# Patient Record
Sex: MALE | Race: WHITE | Employment: FULL TIME | URBAN - NONMETROPOLITAN AREA
[De-identification: names, ages, dates, MRNs, and addresses within clinical notes are randomized per-mention and may not be internally consistent; named-entity substitution may affect disease eponyms.]

---

## 2020-11-20 RX ORDER — LISINOPRIL 40 MG/1
40 TABLET ORAL DAILY
COMMUNITY

## 2020-11-20 RX ORDER — PREDNISONE 20 MG/1
20 TABLET ORAL 2 TIMES DAILY
COMMUNITY

## 2020-11-20 NOTE — PROGRESS NOTES
Following instructions given to patient, who states understanding:     NPO after midnight  [employer] and 's license  Wear comfortable clean clothing  Do not bring jewelry   Shower night before and morning of surgery with a liquid antibacterial soap  Bring medications in original bottles  Follow all instructions given by your physician   needed at discharge  Call [phone] for any questions  Report to [facility] on 2nd floor  If you would become ill prior to surgery, please call the surgeon  May have only 1 visitor accompany you for surgery  Please bring and wear mask  You will be receiving a phone call one or two days before surgery to review covid screening exposure     Covid screening questionnaire complete and negative for symptoms or exposure see chart for documentation.      covid test done 11/18/20 at [facility]    Please limit your exposure to the public after you have your covid test  Please call your doctor immediately if you develop any symptoms of covid prior to your surgery

## 2020-11-20 NOTE — PROGRESS NOTES
In preparation for their surgical procedure above patient was screened for Obstructive Sleep Apnea (GINNY) using the STOP-Bang Questionnaire by the Pre-Admission Testing department. This is a pre-surgical screening tool for patient safety and serves as a recommendation, this WILL NOT cause cancellation of surgery. STOP-Bang Questionnaire  * Do you currently see a pulmonologist?  No     If yes STOP, do not complete. Patient follows with Dr.     1.  Do you snore loudly (able to be heard in the next room)? No    2. Do you often feel tired or sleepy during the daytime? No       3. Has anyone ever told you that you stop breathing during your sleep? No    4. Do you have or are you being treated for high blood pressure? Yes      5. BMI more than 35? BMI (Calculated): 22.8        No    6. Age over 48 years? 54 y.o. No    7. Neck Circumference greater than 17 inches for male or 16 inches for female? Measured           (visits only)            Not Applicable    8. Gender Male? Yes      TOTAL SCORE: 2    GINNY - Low Risk : Yes to 0 - 2 questions  GINNY - Intermediate Risk : Yes to 3 - 4 questions  GINNY - High Risk : Yes to 5 - 8 questions    Adapted from:   STOP Questionnaire: A Tool to Screen Patients for Obstructive Sleep Apnea   SETH Ruffin.P.C., Betty Carvajal M.B.B.S., Geno Hernandez M.D., Ramirez Huggins. Mike Hauser, Ph.D., IFTIKHAR Ortiz.B.B.S., Radha Mendoza M.Sc., Chastity Alcazar M.D., Deja Duncan. SETH Latham.P.C.    Anesthesiology 2008; [id number] Copyright 2008, the [address] of Anesthesiologists, [facility].   ----------------------------------------------------------------------------------------------------------------

## 2020-11-24 NOTE — PROGRESS NOTES
Patient contacted regarding COVID-19 screen. Following questions asked: In the last month, have you been in contact with someone who was confirmed or suspected to have Coronavirus/COVID-19:  Patient stated NO      Pt was informed can be a visitor allowed. Please bring masks. Do you or family members have any of the following symptoms:  Cough-no   Muscle pain-no   Shortness of breath-no   Fever-no   Weakness-no  Severe headache-no   Sore throat-no   Respiratory symptoms-no    Have you traveled internationally in the last month?  No     Have you been to the emergency room recently-no

## 2020-11-25 ENCOUNTER — APPOINTMENT (OUTPATIENT)
Dept: GENERAL RADIOLOGY | Age: 45
End: 2020-11-25
Attending: ORTHOPAEDIC SURGERY
Payer: COMMERCIAL

## 2020-11-25 ENCOUNTER — ANESTHESIA (OUTPATIENT)
Dept: OPERATING ROOM | Age: 45
End: 2020-11-25
Payer: COMMERCIAL

## 2020-11-25 ENCOUNTER — HOSPITAL ENCOUNTER (OUTPATIENT)
Age: 45
Discharge: HOME | End: 2020-11-26
Attending: ORTHOPAEDIC SURGERY | Admitting: ORTHOPAEDIC SURGERY
Payer: COMMERCIAL

## 2020-11-25 ENCOUNTER — ANESTHESIA EVENT (OUTPATIENT)
Dept: OPERATING ROOM | Age: 45
End: 2020-11-25
Payer: COMMERCIAL

## 2020-11-25 VITALS — TEMPERATURE: 98.2 F | DIASTOLIC BLOOD PRESSURE: 76 MMHG | OXYGEN SATURATION: 100 % | SYSTOLIC BLOOD PRESSURE: 140 MMHG

## 2020-11-25 PROBLEM — M47.10 SPONDYLOSIS WITH MYELOPATHY: Status: ACTIVE | Noted: 2020-11-25

## 2020-11-25 LAB
ABO: NORMAL
ANTIBODY SCREEN: NORMAL
POTASSIUM SERPL-SCNC: 4.2 MEQ/L (ref 3.5–5.2)
RH FACTOR: NORMAL

## 2020-11-25 PROCEDURE — 3700000000 HC ANESTHESIA ATTENDED CARE: Performed by: ORTHOPAEDIC SURGERY

## 2020-11-25 PROCEDURE — 72020 X-RAY EXAM OF SPINE 1 VIEW: CPT

## 2020-11-25 PROCEDURE — 2500000003 HC RX 250 WO HCPCS: Performed by: NURSE ANESTHETIST, CERTIFIED REGISTERED

## 2020-11-25 PROCEDURE — 6360000002 HC RX W HCPCS: Performed by: ANESTHESIOLOGY

## 2020-11-25 PROCEDURE — 2500000003 HC RX 250 WO HCPCS: Performed by: ORTHOPAEDIC SURGERY

## 2020-11-25 PROCEDURE — 6370000000 HC RX 637 (ALT 250 FOR IP): Performed by: PHYSICIAN ASSISTANT

## 2020-11-25 PROCEDURE — L0120 CERV FLEX N/ADJ FOAM PRE OTS: HCPCS | Performed by: ORTHOPAEDIC SURGERY

## 2020-11-25 PROCEDURE — 36415 COLL VENOUS BLD VENIPUNCTURE: CPT

## 2020-11-25 PROCEDURE — 86900 BLOOD TYPING SEROLOGIC ABO: CPT

## 2020-11-25 PROCEDURE — 3700000001 HC ADD 15 MINUTES (ANESTHESIA): Performed by: ORTHOPAEDIC SURGERY

## 2020-11-25 PROCEDURE — 6360000002 HC RX W HCPCS: Performed by: NURSE ANESTHETIST, CERTIFIED REGISTERED

## 2020-11-25 PROCEDURE — 3600000014 HC SURGERY LEVEL 4 ADDTL 15MIN: Performed by: ORTHOPAEDIC SURGERY

## 2020-11-25 PROCEDURE — 2580000003 HC RX 258: Performed by: NURSE ANESTHETIST, CERTIFIED REGISTERED

## 2020-11-25 PROCEDURE — 2780000010 HC IMPLANT OTHER: Performed by: ORTHOPAEDIC SURGERY

## 2020-11-25 PROCEDURE — C1713 ANCHOR/SCREW BN/BN,TIS/BN: HCPCS | Performed by: ORTHOPAEDIC SURGERY

## 2020-11-25 PROCEDURE — 7100000010 HC PHASE II RECOVERY - FIRST 15 MIN: Performed by: ORTHOPAEDIC SURGERY

## 2020-11-25 PROCEDURE — 2580000003 HC RX 258: Performed by: PHYSICIAN ASSISTANT

## 2020-11-25 PROCEDURE — 7100000001 HC PACU RECOVERY - ADDTL 15 MIN: Performed by: ORTHOPAEDIC SURGERY

## 2020-11-25 PROCEDURE — 6360000002 HC RX W HCPCS: Performed by: PHYSICIAN ASSISTANT

## 2020-11-25 PROCEDURE — 7100000000 HC PACU RECOVERY - FIRST 15 MIN: Performed by: ORTHOPAEDIC SURGERY

## 2020-11-25 PROCEDURE — 86850 RBC ANTIBODY SCREEN: CPT

## 2020-11-25 PROCEDURE — 84132 ASSAY OF SERUM POTASSIUM: CPT

## 2020-11-25 PROCEDURE — 86901 BLOOD TYPING SEROLOGIC RH(D): CPT

## 2020-11-25 PROCEDURE — 2709999900 HC NON-CHARGEABLE SUPPLY: Performed by: ORTHOPAEDIC SURGERY

## 2020-11-25 PROCEDURE — 3600000004 HC SURGERY LEVEL 4 BASE: Performed by: ORTHOPAEDIC SURGERY

## 2020-11-25 DEVICE — GRAFT BNE SUB W11XH6XL14MM CORT INTBDY FUS FRZ DRY BLK SPCR: Type: IMPLANTABLE DEVICE | Status: FUNCTIONAL

## 2020-11-25 DEVICE — SCREW 3120515 4.0 X 15 SELF DRILL VAR
Type: IMPLANTABLE DEVICE | Site: NECK | Status: FUNCTIONAL
Brand: ATLANTIS® ANTERIOR CERVICAL PLATE SYSTEM

## 2020-11-25 DEVICE — GRAFT BNE SUB W11XH7XL14MM CORT INTBDY FUS FRZ DRY BLK SPCR: Type: IMPLANTABLE DEVICE | Status: FUNCTIONAL

## 2020-11-25 DEVICE — DBM T41120 1CC GRAFTON GEL
Type: IMPLANTABLE DEVICE | Site: NECK | Status: FUNCTIONAL
Brand: GRAFTON®AND GRAFTON PLUS®DEMINERALIZED BONE MATRIX (DBM)

## 2020-11-25 RX ORDER — SODIUM CHLORIDE 0.9 % (FLUSH) 0.9 %
10 SYRINGE (ML) INJECTION PRN
Status: DISCONTINUED | OUTPATIENT
Start: 2020-11-25 | End: 2020-11-25

## 2020-11-25 RX ORDER — LIDOCAINE HCL/PF 100 MG/5ML
SYRINGE (ML) INJECTION PRN
Status: DISCONTINUED | OUTPATIENT
Start: 2020-11-25 | End: 2020-11-25 | Stop reason: SDUPTHER

## 2020-11-25 RX ORDER — HYDROMORPHONE HCL 110MG/55ML
PATIENT CONTROLLED ANALGESIA SYRINGE INTRAVENOUS PRN
Status: DISCONTINUED | OUTPATIENT
Start: 2020-11-25 | End: 2020-11-25 | Stop reason: SDUPTHER

## 2020-11-25 RX ORDER — NEOSTIGMINE METHYLSULFATE 5 MG/5 ML
SYRINGE (ML) INTRAVENOUS PRN
Status: DISCONTINUED | OUTPATIENT
Start: 2020-11-25 | End: 2020-11-25 | Stop reason: SDUPTHER

## 2020-11-25 RX ORDER — SODIUM CHLORIDE 0.9 % (FLUSH) 0.9 %
10 SYRINGE (ML) INJECTION EVERY 12 HOURS SCHEDULED
Status: DISCONTINUED | OUTPATIENT
Start: 2020-11-25 | End: 2020-11-25

## 2020-11-25 RX ORDER — SODIUM CHLORIDE 9 MG/ML
INJECTION, SOLUTION INTRAVENOUS CONTINUOUS PRN
Status: DISCONTINUED | OUTPATIENT
Start: 2020-11-25 | End: 2020-11-25 | Stop reason: SDUPTHER

## 2020-11-25 RX ORDER — ONDANSETRON 2 MG/ML
4 INJECTION INTRAMUSCULAR; INTRAVENOUS
Status: DISCONTINUED | OUTPATIENT
Start: 2020-11-25 | End: 2020-11-25

## 2020-11-25 RX ORDER — ONDANSETRON 2 MG/ML
INJECTION INTRAMUSCULAR; INTRAVENOUS PRN
Status: DISCONTINUED | OUTPATIENT
Start: 2020-11-25 | End: 2020-11-25 | Stop reason: SDUPTHER

## 2020-11-25 RX ORDER — LISINOPRIL 40 MG/1
40 TABLET ORAL DAILY
Status: DISCONTINUED | OUTPATIENT
Start: 2020-11-26 | End: 2020-11-26 | Stop reason: HOSPADM

## 2020-11-25 RX ORDER — OXYCODONE HYDROCHLORIDE AND ACETAMINOPHEN 5; 325 MG/1; MG/1
1 TABLET ORAL EVERY 4 HOURS PRN
Status: DISCONTINUED | OUTPATIENT
Start: 2020-11-25 | End: 2020-11-26 | Stop reason: HOSPADM

## 2020-11-25 RX ORDER — OXYCODONE HYDROCHLORIDE AND ACETAMINOPHEN 5; 325 MG/1; MG/1
2 TABLET ORAL EVERY 4 HOURS PRN
Status: DISCONTINUED | OUTPATIENT
Start: 2020-11-25 | End: 2020-11-26 | Stop reason: HOSPADM

## 2020-11-25 RX ORDER — PROMETHAZINE HYDROCHLORIDE 25 MG/1
12.5 TABLET ORAL EVERY 6 HOURS PRN
Status: DISCONTINUED | OUTPATIENT
Start: 2020-11-25 | End: 2020-11-26 | Stop reason: HOSPADM

## 2020-11-25 RX ORDER — ONDANSETRON 2 MG/ML
4 INJECTION INTRAMUSCULAR; INTRAVENOUS EVERY 6 HOURS PRN
Status: DISCONTINUED | OUTPATIENT
Start: 2020-11-25 | End: 2020-11-26 | Stop reason: HOSPADM

## 2020-11-25 RX ORDER — DEXAMETHASONE SODIUM PHOSPHATE 10 MG/ML
INJECTION, EMULSION INTRAMUSCULAR; INTRAVENOUS PRN
Status: DISCONTINUED | OUTPATIENT
Start: 2020-11-25 | End: 2020-11-25 | Stop reason: SDUPTHER

## 2020-11-25 RX ORDER — MEPERIDINE HYDROCHLORIDE 25 MG/ML
12.5 INJECTION INTRAMUSCULAR; INTRAVENOUS; SUBCUTANEOUS EVERY 5 MIN PRN
Status: DISCONTINUED | OUTPATIENT
Start: 2020-11-25 | End: 2020-11-25

## 2020-11-25 RX ORDER — FENTANYL CITRATE 50 UG/ML
50 INJECTION, SOLUTION INTRAMUSCULAR; INTRAVENOUS EVERY 5 MIN PRN
Status: DISCONTINUED | OUTPATIENT
Start: 2020-11-25 | End: 2020-11-25

## 2020-11-25 RX ORDER — SENNA AND DOCUSATE SODIUM 50; 8.6 MG/1; MG/1
1 TABLET, FILM COATED ORAL 2 TIMES DAILY
Status: DISCONTINUED | OUTPATIENT
Start: 2020-11-25 | End: 2020-11-26 | Stop reason: HOSPADM

## 2020-11-25 RX ORDER — LABETALOL 20 MG/4 ML (5 MG/ML) INTRAVENOUS SYRINGE
5 EVERY 10 MIN PRN
Status: DISCONTINUED | OUTPATIENT
Start: 2020-11-25 | End: 2020-11-25

## 2020-11-25 RX ORDER — GLYCOPYRROLATE 1 MG/5 ML
SYRINGE (ML) INTRAVENOUS PRN
Status: DISCONTINUED | OUTPATIENT
Start: 2020-11-25 | End: 2020-11-25 | Stop reason: SDUPTHER

## 2020-11-25 RX ORDER — LIDOCAINE HYDROCHLORIDE AND EPINEPHRINE 10; 10 MG/ML; UG/ML
INJECTION, SOLUTION INFILTRATION; PERINEURAL PRN
Status: DISCONTINUED | OUTPATIENT
Start: 2020-11-25 | End: 2020-11-25 | Stop reason: ALTCHOICE

## 2020-11-25 RX ORDER — SODIUM CHLORIDE 0.9 % (FLUSH) 0.9 %
10 SYRINGE (ML) INJECTION PRN
Status: DISCONTINUED | OUTPATIENT
Start: 2020-11-25 | End: 2020-11-26 | Stop reason: HOSPADM

## 2020-11-25 RX ORDER — PROPOFOL 10 MG/ML
INJECTION, EMULSION INTRAVENOUS PRN
Status: DISCONTINUED | OUTPATIENT
Start: 2020-11-25 | End: 2020-11-25 | Stop reason: SDUPTHER

## 2020-11-25 RX ORDER — DEXAMETHASONE SODIUM PHOSPHATE 10 MG/ML
8 INJECTION, EMULSION INTRAMUSCULAR; INTRAVENOUS EVERY 8 HOURS
Status: COMPLETED | OUTPATIENT
Start: 2020-11-25 | End: 2020-11-26

## 2020-11-25 RX ORDER — SODIUM CHLORIDE 9 MG/ML
INJECTION, SOLUTION INTRAVENOUS CONTINUOUS
Status: DISCONTINUED | OUTPATIENT
Start: 2020-11-25 | End: 2020-11-26 | Stop reason: HOSPADM

## 2020-11-25 RX ORDER — SODIUM CHLORIDE 0.9 % (FLUSH) 0.9 %
10 SYRINGE (ML) INJECTION EVERY 12 HOURS SCHEDULED
Status: DISCONTINUED | OUTPATIENT
Start: 2020-11-25 | End: 2020-11-26 | Stop reason: HOSPADM

## 2020-11-25 RX ORDER — ROCURONIUM BROMIDE 10 MG/ML
INJECTION, SOLUTION INTRAVENOUS PRN
Status: DISCONTINUED | OUTPATIENT
Start: 2020-11-25 | End: 2020-11-25 | Stop reason: SDUPTHER

## 2020-11-25 RX ORDER — FENTANYL CITRATE 50 UG/ML
INJECTION, SOLUTION INTRAMUSCULAR; INTRAVENOUS PRN
Status: DISCONTINUED | OUTPATIENT
Start: 2020-11-25 | End: 2020-11-25 | Stop reason: SDUPTHER

## 2020-11-25 RX ORDER — CYCLOBENZAPRINE HCL 10 MG
10 TABLET ORAL 3 TIMES DAILY PRN
Status: DISCONTINUED | OUTPATIENT
Start: 2020-11-25 | End: 2020-11-26 | Stop reason: HOSPADM

## 2020-11-25 RX ORDER — POLYETHYLENE GLYCOL 3350 17 G/17G
17 POWDER, FOR SOLUTION ORAL DAILY
Status: DISCONTINUED | OUTPATIENT
Start: 2020-11-25 | End: 2020-11-26 | Stop reason: HOSPADM

## 2020-11-25 RX ORDER — PREDNISONE 20 MG/1
20 TABLET ORAL 2 TIMES DAILY
Status: DISCONTINUED | OUTPATIENT
Start: 2020-11-26 | End: 2020-11-26 | Stop reason: HOSPADM

## 2020-11-25 RX ADMIN — HYDROMORPHONE HYDROCHLORIDE 0.5 MG: 2 INJECTION INTRAMUSCULAR; INTRAVENOUS; SUBCUTANEOUS at 10:26

## 2020-11-25 RX ADMIN — FENTANYL CITRATE 50 MCG: 50 INJECTION, SOLUTION INTRAMUSCULAR; INTRAVENOUS at 09:13

## 2020-11-25 RX ADMIN — PROPOFOL 20 MG: 10 INJECTION, EMULSION INTRAVENOUS at 10:06

## 2020-11-25 RX ADMIN — PROPOFOL 20 MG: 10 INJECTION, EMULSION INTRAVENOUS at 09:25

## 2020-11-25 RX ADMIN — PROPOFOL 10 MG: 10 INJECTION, EMULSION INTRAVENOUS at 10:18

## 2020-11-25 RX ADMIN — HYDROMORPHONE HYDROCHLORIDE 0.5 MG: 1 INJECTION, SOLUTION INTRAMUSCULAR; INTRAVENOUS; SUBCUTANEOUS at 11:52

## 2020-11-25 RX ADMIN — DOCUSATE SODIUM 50MG AND SENNOSIDES 8.6MG 1 TABLET: 8.6; 5 TABLET, FILM COATED ORAL at 20:27

## 2020-11-25 RX ADMIN — BISACODYL 5 MG: 5 TABLET, COATED ORAL at 20:27

## 2020-11-25 RX ADMIN — FENTANYL CITRATE 100 MCG: 50 INJECTION, SOLUTION INTRAMUSCULAR; INTRAVENOUS at 08:51

## 2020-11-25 RX ADMIN — PROPOFOL 20 MG: 10 INJECTION, EMULSION INTRAVENOUS at 09:51

## 2020-11-25 RX ADMIN — HYDROMORPHONE HYDROCHLORIDE 0.5 MG: 2 INJECTION INTRAMUSCULAR; INTRAVENOUS; SUBCUTANEOUS at 09:51

## 2020-11-25 RX ADMIN — PROPOFOL 20 MG: 10 INJECTION, EMULSION INTRAVENOUS at 09:34

## 2020-11-25 RX ADMIN — DEXAMETHASONE SODIUM PHOSPHATE 8 MG: 10 INJECTION, EMULSION INTRAMUSCULAR; INTRAVENOUS at 09:14

## 2020-11-25 RX ADMIN — PROPOFOL 100 MG: 10 INJECTION, EMULSION INTRAVENOUS at 08:52

## 2020-11-25 RX ADMIN — Medication 0.6 MG: at 10:54

## 2020-11-25 RX ADMIN — FENTANYL CITRATE 50 MCG: 50 INJECTION, SOLUTION INTRAMUSCULAR; INTRAVENOUS at 09:22

## 2020-11-25 RX ADMIN — Medication 4 MG: at 10:54

## 2020-11-25 RX ADMIN — PROPOFOL 20 MG: 10 INJECTION, EMULSION INTRAVENOUS at 10:31

## 2020-11-25 RX ADMIN — PROPOFOL 20 MG: 10 INJECTION, EMULSION INTRAVENOUS at 09:49

## 2020-11-25 RX ADMIN — HYDROMORPHONE HYDROCHLORIDE 0.5 MG: 1 INJECTION, SOLUTION INTRAMUSCULAR; INTRAVENOUS; SUBCUTANEOUS at 11:45

## 2020-11-25 RX ADMIN — PROPOFOL 20 MG: 10 INJECTION, EMULSION INTRAVENOUS at 10:01

## 2020-11-25 RX ADMIN — ROCURONIUM BROMIDE 20 MG: 10 INJECTION INTRAVENOUS at 09:28

## 2020-11-25 RX ADMIN — CYCLOBENZAPRINE 10 MG: 10 TABLET, FILM COATED ORAL at 20:27

## 2020-11-25 RX ADMIN — HYDROMORPHONE HYDROCHLORIDE 0.5 MG: 2 INJECTION INTRAMUSCULAR; INTRAVENOUS; SUBCUTANEOUS at 10:02

## 2020-11-25 RX ADMIN — PROPOFOL 20 MG: 10 INJECTION, EMULSION INTRAVENOUS at 10:13

## 2020-11-25 RX ADMIN — CEFAZOLIN 2 G: 10 INJECTION, POWDER, FOR SOLUTION INTRAVENOUS at 09:00

## 2020-11-25 RX ADMIN — ROCURONIUM BROMIDE 20 MG: 10 INJECTION INTRAVENOUS at 09:39

## 2020-11-25 RX ADMIN — PROPOFOL 20 MG: 10 INJECTION, EMULSION INTRAVENOUS at 09:58

## 2020-11-25 RX ADMIN — PROPOFOL 20 MG: 10 INJECTION, EMULSION INTRAVENOUS at 10:09

## 2020-11-25 RX ADMIN — DEXAMETHASONE SODIUM PHOSPHATE 8 MG: 10 INJECTION, EMULSION INTRAMUSCULAR; INTRAVENOUS at 17:04

## 2020-11-25 RX ADMIN — ONDANSETRON HYDROCHLORIDE 4 MG: 4 INJECTION, SOLUTION INTRAMUSCULAR; INTRAVENOUS at 10:48

## 2020-11-25 RX ADMIN — PROPOFOL 30 MG: 10 INJECTION, EMULSION INTRAVENOUS at 09:44

## 2020-11-25 RX ADMIN — OXYCODONE HYDROCHLORIDE AND ACETAMINOPHEN 1 TABLET: 5; 325 TABLET ORAL at 21:20

## 2020-11-25 RX ADMIN — PROPOFOL 10 MG: 10 INJECTION, EMULSION INTRAVENOUS at 09:22

## 2020-11-25 RX ADMIN — HYDROMORPHONE HYDROCHLORIDE 0.5 MG: 1 INJECTION, SOLUTION INTRAMUSCULAR; INTRAVENOUS; SUBCUTANEOUS at 14:47

## 2020-11-25 RX ADMIN — PROPOFOL 20 MG: 10 INJECTION, EMULSION INTRAVENOUS at 09:37

## 2020-11-25 RX ADMIN — CEFAZOLIN 2 G: 10 INJECTION, POWDER, FOR SOLUTION INTRAVENOUS at 17:06

## 2020-11-25 RX ADMIN — CYCLOBENZAPRINE 10 MG: 10 TABLET, FILM COATED ORAL at 11:47

## 2020-11-25 RX ADMIN — PROPOFOL 50 MG: 10 INJECTION, EMULSION INTRAVENOUS at 10:42

## 2020-11-25 RX ADMIN — Medication 100 MG: at 08:52

## 2020-11-25 RX ADMIN — ROCURONIUM BROMIDE 40 MG: 10 INJECTION INTRAVENOUS at 08:52

## 2020-11-25 RX ADMIN — POLYETHYLENE GLYCOL 3350 17 G: 17 POWDER, FOR SOLUTION ORAL at 20:27

## 2020-11-25 RX ADMIN — SODIUM CHLORIDE: 9 INJECTION, SOLUTION INTRAVENOUS at 10:47

## 2020-11-25 RX ADMIN — HYDROMORPHONE HYDROCHLORIDE 0.5 MG: 1 INJECTION, SOLUTION INTRAMUSCULAR; INTRAVENOUS; SUBCUTANEOUS at 23:55

## 2020-11-25 RX ADMIN — PROPOFOL 10 MG: 10 INJECTION, EMULSION INTRAVENOUS at 09:15

## 2020-11-25 RX ADMIN — HYDROMORPHONE HYDROCHLORIDE 0.5 MG: 1 INJECTION, SOLUTION INTRAMUSCULAR; INTRAVENOUS; SUBCUTANEOUS at 18:53

## 2020-11-25 RX ADMIN — PROPOFOL 20 MG: 10 INJECTION, EMULSION INTRAVENOUS at 09:30

## 2020-11-25 RX ADMIN — SODIUM CHLORIDE: 9 INJECTION, SOLUTION INTRAVENOUS at 08:45

## 2020-11-25 RX ADMIN — PROPOFOL 20 MG: 10 INJECTION, EMULSION INTRAVENOUS at 10:21

## 2020-11-25 ASSESSMENT — PAIN SCALES - GENERAL
PAINLEVEL_OUTOF10: 10
PAINLEVEL_OUTOF10: 0
PAINLEVEL_OUTOF10: 3
PAINLEVEL_OUTOF10: 5
PAINLEVEL_OUTOF10: 3
PAINLEVEL_OUTOF10: 7
PAINLEVEL_OUTOF10: 10
PAINLEVEL_OUTOF10: 0
PAINLEVEL_OUTOF10: 0
PAINLEVEL_OUTOF10: 10
PAINLEVEL_OUTOF10: 6
PAINLEVEL_OUTOF10: 6

## 2020-11-25 ASSESSMENT — PULMONARY FUNCTION TESTS
PIF_VALUE: 17
PIF_VALUE: 17
PIF_VALUE: 15
PIF_VALUE: 18
PIF_VALUE: 16
PIF_VALUE: 15
PIF_VALUE: 17
PIF_VALUE: 18
PIF_VALUE: 15
PIF_VALUE: 17
PIF_VALUE: 15
PIF_VALUE: 1
PIF_VALUE: 19
PIF_VALUE: 15
PIF_VALUE: 17
PIF_VALUE: 20
PIF_VALUE: 16
PIF_VALUE: 17
PIF_VALUE: 15
PIF_VALUE: 1
PIF_VALUE: 15
PIF_VALUE: 16
PIF_VALUE: 13
PIF_VALUE: 13
PIF_VALUE: 16
PIF_VALUE: 17
PIF_VALUE: 0
PIF_VALUE: 24
PIF_VALUE: 15
PIF_VALUE: 17
PIF_VALUE: 14
PIF_VALUE: 22
PIF_VALUE: 2
PIF_VALUE: 17
PIF_VALUE: 16
PIF_VALUE: 15
PIF_VALUE: 15
PIF_VALUE: 16
PIF_VALUE: 15
PIF_VALUE: 1
PIF_VALUE: 16
PIF_VALUE: 14
PIF_VALUE: 15
PIF_VALUE: 18
PIF_VALUE: 16
PIF_VALUE: 18
PIF_VALUE: 19
PIF_VALUE: 17
PIF_VALUE: 13
PIF_VALUE: 16
PIF_VALUE: 16
PIF_VALUE: 17
PIF_VALUE: 15
PIF_VALUE: 14
PIF_VALUE: 17
PIF_VALUE: 14
PIF_VALUE: 15
PIF_VALUE: 1
PIF_VALUE: 15
PIF_VALUE: 16
PIF_VALUE: 18
PIF_VALUE: 0
PIF_VALUE: 1
PIF_VALUE: 18
PIF_VALUE: 18
PIF_VALUE: 16
PIF_VALUE: 3
PIF_VALUE: 16
PIF_VALUE: 15
PIF_VALUE: 19
PIF_VALUE: 15
PIF_VALUE: 17
PIF_VALUE: 17
PIF_VALUE: 16
PIF_VALUE: 16
PIF_VALUE: 6
PIF_VALUE: 18
PIF_VALUE: 16
PIF_VALUE: 17
PIF_VALUE: 17
PIF_VALUE: 16
PIF_VALUE: 20
PIF_VALUE: 17
PIF_VALUE: 0
PIF_VALUE: 19
PIF_VALUE: 16
PIF_VALUE: 22
PIF_VALUE: 19
PIF_VALUE: 15
PIF_VALUE: 17
PIF_VALUE: 18
PIF_VALUE: 19
PIF_VALUE: 15
PIF_VALUE: 16
PIF_VALUE: 15
PIF_VALUE: 17
PIF_VALUE: 15
PIF_VALUE: 21
PIF_VALUE: 0
PIF_VALUE: 16
PIF_VALUE: 19
PIF_VALUE: 16
PIF_VALUE: 18
PIF_VALUE: 16
PIF_VALUE: 18
PIF_VALUE: 15
PIF_VALUE: 16
PIF_VALUE: 16
PIF_VALUE: 15
PIF_VALUE: 16
PIF_VALUE: 19
PIF_VALUE: 18
PIF_VALUE: 16
PIF_VALUE: 16
PIF_VALUE: 18
PIF_VALUE: 18
PIF_VALUE: 19
PIF_VALUE: 1
PIF_VALUE: 6
PIF_VALUE: 18
PIF_VALUE: 14
PIF_VALUE: 14
PIF_VALUE: 18
PIF_VALUE: 8
PIF_VALUE: 15
PIF_VALUE: 0
PIF_VALUE: 19
PIF_VALUE: 16
PIF_VALUE: 15
PIF_VALUE: 15
PIF_VALUE: 18
PIF_VALUE: 19
PIF_VALUE: 18
PIF_VALUE: 1
PIF_VALUE: 17
PIF_VALUE: 13
PIF_VALUE: 3
PIF_VALUE: 15
PIF_VALUE: 0

## 2020-11-25 ASSESSMENT — PAIN DESCRIPTION - PROGRESSION
CLINICAL_PROGRESSION: NOT CHANGED

## 2020-11-25 ASSESSMENT — PAIN DESCRIPTION - PAIN TYPE
TYPE: ACUTE PAIN
TYPE: OTHER (COMMENT)
TYPE: SURGICAL PAIN

## 2020-11-25 ASSESSMENT — PAIN DESCRIPTION - FREQUENCY
FREQUENCY: CONTINUOUS

## 2020-11-25 ASSESSMENT — PAIN DESCRIPTION - LOCATION
LOCATION: HEAD
LOCATION: BACK;THROAT;NECK

## 2020-11-25 ASSESSMENT — PAIN DESCRIPTION - ONSET: ONSET: ON-GOING

## 2020-11-25 NOTE — H&P
I have reviewed the history and physical and examined the patient. I find no relevant changes. I have reviewed with the patient and/or family members, during the preoperative office visit the risks, benefits, and alternatives to the procedure.     Lucy Barr

## 2020-11-25 NOTE — PROGRESS NOTES
Pt states that he feels like his spine at neck is not aligned correct. He does not want any pain medication. Neck brace is in place  And LELAND drain bulb is compressed. Anterior Neck dressing is dry and intact. Pt does not want pulse oximeter on for vital sign check. Explained that we have to do vital signs during his recovery and post op time.

## 2020-11-25 NOTE — PROGRESS NOTES
ANALI OCASIO ON 7K CALLED TO TELL HER DR. Tamiko Cota WILL NOT SEE PT TODAY. PT TRANSPORTED TO [facility].

## 2020-11-25 NOTE — PROGRESS NOTES
Patient arrived to [address] [place] [address], from \[facility]\"". 0.9 infusing into the left arm at 100ml/hr. See flowsheets for VS and assessment.

## 2020-11-25 NOTE — PROGRESS NOTES
1119 Pt transferred to PACU, see flow sheet for assessment. 1145 Pt complaining of neck and shoulder pain, see MAR.  1147 Pt tolerated swallowing pill and water with no difficulties. Ice pack placed to posterior neck. 5 Pt refusing SCDs. 1210 Pt becoming very irrigated with heart monitor sound and wants to go somewhere quieter. 1213 Pt transferred to [facility], report given to [facility], no in room.

## 2020-11-25 NOTE — BRIEF OP NOTE
Brief Postoperative Note      Patient: Hasmukh Hyatt  YOB: 1975  MRN: [id number]    Date of Procedure: 11/25/2020    Pre-Op Diagnosis: SPONDYLOSIS WITH MYELOPATHY    Post-Op Diagnosis: Same       Procedure(s):  ACDF C5-C7    Surgeon(s):  Jhoan Spaulding MD    Assistant:  Jeremy [facility]    Anesthesia: General    Estimated Blood Loss (mL): less than 50     Complications: None    Specimens:   * No specimens in log *    Implants:  Implant Name Type Inv.  Item Serial No.  Lot No. LRB No. Used Action   GRAFT BNE SUB 1CC DEMIN BNE MTRX GEL GRFTON - [id number]  GRAFT BNE SUB 1CC DEMIN BNE MTRX GEL GRFTON [id number] [employer]-WD  N/A 1 Implanted   GRAFT BNE SUB [id number] MICKEY INTBDY FUS FRZ DRY BLK SPCR - [id number]  GRAFT BNE SUB [id number] MICKEY INTBDY FUS FRZ DRY BLK SPCR [id number] [employer] SPINALGRA[employer] TECH-WD  N/A 1 Implanted   GRAFT BNE SUB [id number] MICKEY INTBDY FUS FRZ DRY BLK SPCR - [id number]  GRAFT BNE SUB [id number] MICKEY INTBDY FUS FRZ DRY BLK SPCR [id number] [employer] SPINALGRAFT TECH-WD  N/A 1 Implanted   PLATE ATLANTIS ELITE 42MM Spine PLATE ATLANTIS ELITE 42MM  [employer] [place] [employer]-PMM  N/A 1 Implanted   SCREW SPNL L15MM DIA4MM CANC ANT CERV TI SELF DRL FRAN ANG  SCREW SPNL L15MM DIA4MM CANC ANT CERV TI SELF DRL FRAN ANG  [employer] SOFAMOR DANEK-WD  N/A 6 Implanted         Drains:   Closed/Suction Drain Right Neck Bulb (Active)       Findings: same    Electronically signed by Jhoan Spaulding MD on 11/25/2020 at 11:07 AM

## 2020-11-25 NOTE — PROGRESS NOTES
Dr. Reyna Zamora saw pt and readjusted neck strap. Pt described to Jimbo Ridley the discomfort he has.

## 2020-11-25 NOTE — H&P
[address] [place] [address]                       PREOPERATIVE HISTORY AND PHYSICAL    PATIENT NAME: Héctor Gutiérrez                   :        1975  MED REC NO:   [id number]                           ROOM:  ACCOUNT NO:   [de-identified]                           ADMIT DATE: 2020  PROVIDER:     Thony Finley PA-C    This is a patient of a Dr. Kalin Mccray who will undergo surgery on the  date of 2020 at [facility] which include ACDF of  the levels of C5 to C7 with allograft bone and plating. CHIEF COMPLAINT:  Cervical pain and right upper extremity pain with  numbness and weakness. HISTORY OF PRESENT ILLNESS:  The patient is a 21-year-old male who  presented to our office today for preoperative history and physical.  He  is a patient who was recently seen in the office with complaints of very  significant strength change in the right upper extremity due to loss of   and loss of coordination with the use of his right arm. These  symptoms have been worsening now over the past several months. He was  seen and evaluated by Dr. Juan Aleman and an MRI scan of the  cervical spine was completed. He was referred to us for further  evaluation due to significant cervical stenosis seen at levels C5-C6 and  C6-C7 with disk herniations and significant canal stenosis. He has had  significant change with his right upper extremity strength as well as  pain and tingling and numbing sensations at times into the right upper  extremity and also incidental finding on his MRI scan did show a T2  hyperintense lesion concerning for possible demyelination pattern on the  MRI scan. He was seen by a specialist for MS down at [facility] and was cleared for surgery as he does have very significant  nerve compression.   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 He has been  medically cleared by his family provider, Dr. Jennifer Benites to  undergo this operation. DRUG ALLERGIES:  MORPHINE. CURRENT MEDICATIONS:  Include lisinopril and prednisone. PAST MEDICAL HISTORY:  Includes recent diagnosis of multiple sclerosis  as well as hypertension. PAST SURGICAL HISTORY:  Includes knee surgery in 1992, appendectomy in  2011, left carpal tunnel release and ulnar nerve decompression in 2015. SOCIAL HISTORY:  Smoking status, he is a nonsmoker. He lives at home  with his daughter. He is currently employed, but has been off work due  to his recent illness. REVIEW OF SYSTEMS:  GENERAL:  Denies fever, fatigue or chills. RESPIRATORY:  Denies shortness of breath, productive cough, wheezing. CARDIOVASCULAR:  Denies chest pain, palpitations or leg swelling. GASTROINTESTINAL:  Denies nausea, vomiting, diarrhea, or abdominal pain. GENITOURINARY:  Denies dysuria or bladder incontinence. NEUROLOGIC:   Denies seizures, blackout, fainting or headaches. MUSCULOSKELETAL:   Significant for arm pain, neck pain and joint pain. PHYSICAL EXAMINATION:  VITAL SIGNS:  Most recent vital signs show height 5 feet 9 inches,  weight 154 pounds, BMI is 22.74. GENERAL : The patient is pleasant, cooperative, awake and alert x3. He  is seated in a chair in no acute distress. EXTREMITIES:  Examination of bilateral upper extremities shows skin  warm, dry, and intact. Pulse 2+ in the radial artery. Strength exam  does show significant weakness with right-sided , finger extension,  elbow extension, all maintained 4/5, these are 5/5 on the left. 5/5  strength is maintained with bilateral elbow flexion, wrist extension and  shoulder abduction. Sensation is intact throughout the cervical spine  bilaterally to light touch.     IMAGING STUDIES:  Cervical MRI scan dated 11/03/2020 shows a central  disk protrusion at C6-C7 indenting the anterior aspect of the cervical  cord with AP diameter of the canal measuring 7 mm and at C5-C6 central  disk herniation impinging upon the anterior aspect of the spinal canal  with AP diameter measuring 8 mm. ASSESSMENT:  Cervical spinal stenosis with radiculopathy and myelopathy. PLAN:  The patient will be undergoing ACDF of C5 to C7 with allograft  bone and plating. CONCLUSION:  The patient is a gentleman who has dealt with a very rapid  onset of significant right-sided upper extremity pain and right-sided  weakness. He has failed to improve and is declining neurologically. He  is in need of urgent surgical decompression at level of C5-C6 and C6 and  C7 for treatment of the cervical myelopathy that has been found. We  have received informed consent from the patient. We have gone over the  risks and benefits of the surgery which include postoperative pain,  dysphagia, voice change, nerve root injury, spinal fluid leak, and  difficulty anesthesia. We have answered the patient's questions and  concerns to his satisfaction. Absolutely no guarantees have been made  for the surgery.         Armand [place]    D: 11/24/2020 17:28:30       T: 11/24/2020 17:33:35     [id number]  Job#: [id number]     Doc#: [id number]

## 2020-11-25 NOTE — ANESTHESIA POSTPROCEDURE EVALUATION
Department of Anesthesiology  Postprocedure Note    Patient: Kevyn Rose  MRN: [id number]  YOB: 1975  Date of evaluation: 11/25/2020  Time:  4:07 PM     Procedure Summary     Date:  11/25/20 Room / Location:  [address] DARON Rae    Anesthesia Start:  Simpsonsheryl Peralta Anesthesia Stop:  4174    Procedure:  ACDF C5-C7 (N/A Neck) Diagnosis:  (SPONDYLOSIS WITH MYELOPATHY)    Surgeon:  Aga Hale MD [facility]ible [facility]  [facility],     Anesthesia Type:  general ASA Status:  3          Anesthesia Type: general    Raf Phase I: Raf Score: 10    Raf Phase II: Raf Score: 10    Last vitals: Reviewed and per EMR flowsheets.        Anesthesia Post Evaluation    Patient location during evaluation: PACU  Patient participation: complete - patient participated  Level of consciousness: awake and alert  Pain score: 3  Airway patency: patent  Nausea & Vomiting: no nausea and no vomiting  Complications: no  Cardiovascular status: hemodynamically stable and blood pressure returned to baseline  Respiratory status: spontaneous ventilation, room air and acceptable  Hydration status: stable

## 2020-11-25 NOTE — ANESTHESIA PRE PROCEDURE
Department of Anesthesiology  Preprocedure Note       Name:  Julian Arteaga   Age:  54 y.o.  :  1975                                          MRN:  [id number]         Date:  2020      Surgeon: Juan Diego Dunbar):  Cami Robin MD    Procedure: Procedure(s):  ACDF C5-C7    Medications prior to admission:   Prior to Admission medications    Medication Sig Start Date End Date Taking? Authorizing Provider   predniSONE (DELTASONE) 20 MG tablet Take 20 mg by mouth 2 times daily   Yes Historical Provider, MD   lisinopril (PRINIVIL;ZESTRIL) 40 MG tablet Take 40 mg by mouth daily   Yes Historical Provider, MD   Aspirin-Acetaminophen-Caffeine (EXCEDRIN MIGRAINE PO) Take 2 tablets by mouth daily as needed   Yes Historical Provider, MD       Current medications:    Current Facility-Administered Medications   Medication Dose Route Frequency Provider Last Rate Last Dose    sodium chloride flush 0.9 % injection 10 mL  10 mL Intravenous 2 times per day Jesus Montenegro PA-C        sodium chloride flush 0.9 % injection 10 mL  10 mL Intravenous PRN Jesus Montenegro PA-C        ceFAZolin (ANCEF) 2 g in dextrose 5 % 50 mL IVPB  2 g Intravenous On Call to [address] Pace PA-C           Allergies: Allergies   Allergen Reactions    Morphine And Related Hives       Problem List:  There is no problem list on file for this patient. Past Medical History:        Diagnosis Date    Hypertension     Insomnia     Multiple sclerosis ([facility] [address] 2020    Myalgia        Past Surgical History:        Procedure Laterality Date    APPENDECTOMY      ELBOW SURGERY Bilateral     KNEE SURGERY Left     MCL, ACL Repair       Social History:    Social History     Tobacco Use    Smoking status: Never Smoker    Smokeless tobacco: Current User     Types: Chew   Substance Use Topics    Alcohol use:  No                                Ready to quit: Not Answered  Counseling given: Not Answered      Vital Signs (Current):   Vitals: 11/20/20 1131 11/25/20 0700   BP:  125/88   Pulse:  73   Resp:  16   Temp:  97.8 °F (36.6 °C)   TempSrc:  Temporal   SpO2:  96%   Weight: 154 lb (69.9 kg) 152 lb 6.4 oz (69.1 kg)   Height: 5' 9\" (1.753 m) 5' 9\" (1.753 m)                                              BP Readings from Last 3 Encounters:   11/25/20 125/88   05/11/16 117/77       NPO Status:                                                                                 BMI:   Wt Readings from Last 3 Encounters:   11/25/20 152 lb 6.4 oz (69.1 kg)   05/11/16 157 lb 2 oz (71.3 kg)     Body mass index is 22.51 kg/m². CBC: No results found for: WBC, RBC, HGB, HCT, MCV, RDW, PLT    CMP: No results found for: NA, K, CL, CO2, BUN, CREATININE, GFRAA, AGRATIO, LABGLOM, GLUCOSE, PROT, CALCIUM, BILITOT, ALKPHOS, AST, ALT    POC Tests: No results for input(s): POCGLU, POCNA, POCK, POCCL, POCBUN, POCHEMO, POCHCT in the last 72 hours.     Coags: No results found for: PROTIME, INR, APTT    HCG (If Applicable): No results found for: PREGTESTUR, PREGSERUM, HCG, HCGQUANT     ABGs: No results found for: PHART, PO2ART, LOP5XFM, YHV2BBE, BEART, Y3VEBTUZ     Type & Screen (If Applicable):  No results found for: LABABO, LABRH    Drug/Infectious Status (If Applicable):  No results found for: HIV, HEPCAB    COVID-19 Screening (If Applicable): No results found for: COVID19      Anesthesia Evaluation  Patient summary reviewed and Nursing notes reviewed no history of anesthetic complications:   Airway: Mallampati: II  TM distance: >3 FB   Neck ROM: full  Mouth opening: > = 3 FB Dental:          Pulmonary:Negative Pulmonary ROS and normal exam  breath sounds clear to auscultation                             Cardiovascular:  Exercise tolerance: good (>4 METS),   (+) hypertension:,                   Neuro/Psych:   (+) neuromuscular disease (denies any recent flare ups or symptoms of advanced disease): multiple sclerosis,             GI/Hepatic/Renal: Neg GI/Hepatic/Renal ROS Endo/Other: Negative Endo/Other ROS             Pt had no PAT visit       Abdominal:           Vascular: negative vascular ROS. Anesthesia Plan      general     ASA 3       Induction: intravenous. MIPS: Postoperative opioids intended and Prophylactic antiemetics administered. Anesthetic plan and risks discussed with patient and spouse. Plan discussed with CRNA.                   Fer Draper DO   11/25/2020

## 2020-11-25 NOTE — PROGRESS NOTES
Patient admitted to [facility] room 11 with mother at bedside. Bed in low position side rails up call light in reach. Patient denies questions at this time.

## 2020-11-25 NOTE — PROGRESS NOTES
Report called to Chinedu Taylor on [address] [facility]. Ortiz Llamas called to verify that Dr. Wiliam Aranda will not see pt today. Ortiz Llamas states that he told the pt that they will see him tomorrow.

## 2020-11-26 VITALS
SYSTOLIC BLOOD PRESSURE: 141 MMHG | OXYGEN SATURATION: 93 % | TEMPERATURE: 98.4 F | HEIGHT: 69 IN | HEART RATE: 124 BPM | DIASTOLIC BLOOD PRESSURE: 96 MMHG | BODY MASS INDEX: 22.57 KG/M2 | WEIGHT: 152.4 LBS | RESPIRATION RATE: 18 BRPM

## 2020-11-26 LAB
BASOPHILS # BLD: 0.1 %
BASOPHILS ABSOLUTE: 0 THOU/MM3 (ref 0–0.1)
EOSINOPHIL # BLD: 0 %
EOSINOPHILS ABSOLUTE: 0 THOU/MM3 (ref 0–0.4)
ERYTHROCYTE [DISTWIDTH] IN BLOOD BY AUTOMATED COUNT: 12.9 % (ref 11.5–14.5)
ERYTHROCYTE [DISTWIDTH] IN BLOOD BY AUTOMATED COUNT: 44.5 FL (ref 35–45)
HCT VFR BLD CALC: 44.6 % (ref 42–52)
HEMOGLOBIN: 15.1 GM/DL (ref 14–18)
IMMATURE GRANS (ABS): 0.11 THOU/MM3 (ref 0–0.07)
IMMATURE GRANULOCYTES: 0.6 %
LYMPHOCYTES # BLD: 6.2 %
LYMPHOCYTES ABSOLUTE: 1.2 THOU/MM3 (ref 1–4.8)
MCH RBC QN AUTO: 31.8 PG (ref 26–33)
MCHC RBC AUTO-ENTMCNC: 33.9 GM/DL (ref 32.2–35.5)
MCV RBC AUTO: 93.9 FL (ref 80–94)
MONOCYTES # BLD: 2.7 %
MONOCYTES ABSOLUTE: 0.5 THOU/MM3 (ref 0.4–1.3)
NUCLEATED RED BLOOD CELLS: 0 /100 WBC
PLATELET # BLD: 252 THOU/MM3 (ref 130–400)
PMV BLD AUTO: 10.1 FL (ref 9.4–12.4)
RBC # BLD: 4.75 MILL/MM3 (ref 4.7–6.1)
SEG NEUTROPHILS: 90.4 %
SEGMENTED NEUTROPHILS ABSOLUTE COUNT: 18 THOU/MM3 (ref 1.8–7.7)
WBC # BLD: 19.9 THOU/MM3 (ref 4.8–10.8)

## 2020-11-26 PROCEDURE — 2580000003 HC RX 258: Performed by: PHYSICIAN ASSISTANT

## 2020-11-26 PROCEDURE — 6360000002 HC RX W HCPCS: Performed by: PHYSICIAN ASSISTANT

## 2020-11-26 PROCEDURE — 36415 COLL VENOUS BLD VENIPUNCTURE: CPT

## 2020-11-26 PROCEDURE — 85025 COMPLETE CBC W/AUTO DIFF WBC: CPT

## 2020-11-26 PROCEDURE — 6370000000 HC RX 637 (ALT 250 FOR IP): Performed by: PHYSICIAN ASSISTANT

## 2020-11-26 RX ORDER — CYCLOBENZAPRINE HCL 10 MG
10 TABLET ORAL 3 TIMES DAILY PRN
Qty: 50 TABLET | Refills: 0 | Status: SHIPPED | OUTPATIENT
Start: 2020-11-26 | End: 2020-11-26

## 2020-11-26 RX ORDER — OXYCODONE HYDROCHLORIDE AND ACETAMINOPHEN 5; 325 MG/1; MG/1
1 TABLET ORAL EVERY 4 HOURS PRN
Qty: 42 TABLET | Refills: 0 | Status: SHIPPED | OUTPATIENT
Start: 2020-11-26 | End: 2020-11-26

## 2020-11-26 RX ORDER — OXYCODONE HYDROCHLORIDE AND ACETAMINOPHEN 5; 325 MG/1; MG/1
1 TABLET ORAL EVERY 4 HOURS PRN
Qty: 42 TABLET | Refills: 0 | Status: SHIPPED | OUTPATIENT
Start: 2020-11-26 | End: 2020-12-03

## 2020-11-26 RX ORDER — CYCLOBENZAPRINE HCL 10 MG
10 TABLET ORAL 3 TIMES DAILY PRN
Qty: 50 TABLET | Refills: 0 | Status: SHIPPED | OUTPATIENT
Start: 2020-11-26 | End: 2020-12-06

## 2020-11-26 RX ORDER — DIAZEPAM 5 MG/1
5 TABLET ORAL EVERY 6 HOURS PRN
Status: DISCONTINUED | OUTPATIENT
Start: 2020-11-26 | End: 2020-11-26 | Stop reason: HOSPADM

## 2020-11-26 RX ADMIN — OXYCODONE HYDROCHLORIDE AND ACETAMINOPHEN 1 TABLET: 5; 325 TABLET ORAL at 03:50

## 2020-11-26 RX ADMIN — SODIUM CHLORIDE: 9 INJECTION, SOLUTION INTRAVENOUS at 03:42

## 2020-11-26 RX ADMIN — LISINOPRIL 40 MG: 40 TABLET ORAL at 08:58

## 2020-11-26 RX ADMIN — CEFAZOLIN 2 G: 10 INJECTION, POWDER, FOR SOLUTION INTRAVENOUS at 00:56

## 2020-11-26 RX ADMIN — DEXAMETHASONE SODIUM PHOSPHATE 8 MG: 10 INJECTION, EMULSION INTRAMUSCULAR; INTRAVENOUS at 08:55

## 2020-11-26 RX ADMIN — DEXAMETHASONE SODIUM PHOSPHATE 8 MG: 10 INJECTION, EMULSION INTRAMUSCULAR; INTRAVENOUS at 00:56

## 2020-11-26 RX ADMIN — OXYCODONE HYDROCHLORIDE AND ACETAMINOPHEN 1 TABLET: 5; 325 TABLET ORAL at 07:58

## 2020-11-26 ASSESSMENT — PAIN SCALES - GENERAL
PAINLEVEL_OUTOF10: 4
PAINLEVEL_OUTOF10: 5
PAINLEVEL_OUTOF10: 2
PAINLEVEL_OUTOF10: 1

## 2020-11-26 NOTE — OP NOTE
[address] [place] [address]                                OPERATIVE REPORT    PATIENT NAME: Harika Douglas                   :        1975  MED REC NO:   [id number]                           ROOM:       0025  ACCOUNT NO:   [de-identified]                           ADMIT DATE: 2020  PROVIDER:     Camilo Mack. Beryle Haley, M.D.    Dinorah Aranaer:  2020    PREOPERATIVE DIAGNOSES:  Cervical spondylosis and stenosis with  osteophyte and disk herniation with a resultant cervical radiculopathy  and myelopathy. POSTOPERATIVE DIAGNOSES:  Cervical spondylosis and stenosis with  osteophyte disk herniation with a resultant cervical radiculopathy and  myelopathy. PROCEDURES PERFORMED:  1. Anterior cervical diskectomies of levels of C5-C6 and C6-C7 with      complete uncus decompression and clearance of canal across each of these      two levels. 2.  Anterior cervical interbody fusion of levels of C5-C6 and C6-C7. 3.  Use of allograft bone tricortical structure for each of these two      levels of interbody fusion, Medtronic cornerstone. 4.  Use of allograft bone DBM [place] for the central filling of each of      these two levels of interbody fusion. 5.  Plating of cervical spine of levels of C5, C6 and C7 with use of the      42.5 mm Medtronic Atlantis Elite plate attached to bone with a total of      six screws. SURGEON:  Capri Meadows MD    ASSISTANT:  Yelena Matta PA-C    ANESTHESIA:  General.    BLOOD LOSS:  20 mL. DRAIN:  Alfredo-Livingston. COMPLICATIONS:  Zero. INDICATION:  The patient is 54years of age gentleman with a severe  stenosis of levels of C5-C6 and C6-C7 with continued complaints of  weakness, change of balance and strength. He shows evidence of severe  stenosis of levels of C5-C6 and C6-C7.   He also suffers from multiple  sclerosis and sees a neurologist in [place] at [facility]  for this condition. He was recently seen by his neurologist and advised  to proceed with cervical spine surgery and to relieve this  neurocompression, which was compounding his neurological deficits. He  understands that with both of these diagnoses, his neurological  condition is more difficult to control and even with regard to the  recovery of the cervical spine, there may be challenges with his  symptoms of myelopathy that have progressed at this point. He shows  severe intrinsic weakness to both of the upper extremities. He is  impaired for strength and balance and our goal is to relieve this  neurocompression to alleviate the neurocompressive effect upon the  spinal cord that is causing this change of his function. With this  multiple sclerosis, he will go on to require continued medical treatment  as he understands this, but he has been advised by his neurologist and  both by myself to proceed with this operation with the hope of improving  his neurological condition. I discussed with the patient prior to  surgery risks, benefits, postop care followup. I have answered all of  his questions and he feels ready to proceed on today's date. DESCRIPTION OF THE PROCEDURE:  We brought the patient to the operating  room whereupon entry, timeout would be observed. His anesthetic was  delivered, airway secured, Galeano catheter was not used. He was  positioned supine on the operating table with gentle extension of the  head and neck area for a prepping and draping of the cervical spine with  use of a soap scrubbed solution, sterile toweling, and ChloraPrep  solution. We applied sterile sheets, Christine Dow as well, marking the skin  levels of the anterior neck at the carotid tubercle location going  leftward in a curvilinear manner measuring 2 inches. I would inject 2  mL of 1% lidocaine with epinephrine along the line of incision after  thorough prepping and draping. IV Ancef was infused to completion.    Skin was then incised, hemostasis maintained to divide skin and  platysmal layer. The omohyoid muscle was isolated and the medial border  freed so that it could be retracted laterally with the  sternocleidomastoid muscle and carotid sheath. This allowed for the  retraction of the esophagus and trachea rightward with division of the  pretracheal fascia to retract soft tissues allowing us to place a pin at  the level of C5-C6, so x-rays could be taken, levels of operation were  confirmed and I reviewed these myself. I began by placing the [place]  pin distraction and blade retraction at level of C5-C6. Brought the  microscope and then would perform a complete anterior cervical  diskectomy for relief of neurocompression, clearance of the canal and  removal of all of this disk material that was neurocompressive. The  canal could be thoroughly cleared bilaterally with removal of all disk  herniation as well as PLL posteriorly. With the relief of all of this  neurocompression with use of small Kerrison rongeurs, we also prepared  the interbody surface for fusion with use of a gemma down to a bleeding  bone for fusion and would place a 6 x 14 x 11 mm graft with use of the  DBM bone graft putty to augment the fusion at level of C5-C6. I then  relaxed the distraction pins and removed the C5 pin on the level of C7  and for level of C6-C7, would similarly maintain soft tissue protection  disk space distraction and perform a complete cervical spine diskectomy  with use of a curettage, gemma and Kerrison rongeur at level of C6-C7.    The canal could be thoroughly cleared, endplates well decorticated, all  posterior aspect taken down and with relief of neurocompression at this  level, the ball-tipped probe would then pass well to each of these  opening foramina bilateral.  With the canal being thoroughly cleared and  confirmed to be clear with use of a ball-tipped probe, we were satisfied  with the decompression and would proceed to place a bone graft within  this interbody surface at level of C6-C7 measuring 7 x 14 x 11 mm of  size tricortical in structure with interbody bone graft DBM placed  within the graft. The interbody graft did very well, we relaxed the  distraction pins, placed the wax and thrombin in their holes and with  endplate levels of C5 through C7. The plating was then bent to  appropriately contour the cervical spinal lordosis and six screws were  used total; two for level in C5, two in C6, and two in C7, each engaging  the plate and locking mechanism engaged on each level. We then  irrigated thoroughly, suctioned dry and placed the Alfredo-Livingston drain,  closed in layers and reviewed x-rays  final that would show well  placed grafting and plating at levels of C5 through C7. The drain tube  was connected in a star closure and then would apply dressing and an  [place] collar. We then took the patient back to recovery postextubation  without complication or difficulty. My first assistant was Tre Finley as well throughout the operation. Tre Finley PA-C, assisted throughout the procedure with positioning,  draping, retraction, wound closure, dressing, and splint application.         Kena Dixon M.D.    D: 11/25/2020 11:32:00       T: 11/25/2020 13:51:08     [email or web address]  Job#: [id number]     Doc#: [id number]    CC:

## 2020-11-26 NOTE — PROGRESS NOTES
Discussed high pulse and BP. Patient is seeing PCP with newly diagnosed HTN. Lisinopril started in an increasing dose. Patients has one dose left before increasing to the higher dose on Saturday. Took prescribed home medication.

## 2020-11-26 NOTE — PROGRESS NOTES
Discharge teaching completed using teach back method. Questions answered. Assisted to car in [facility] with transport.     Referral called to [facility] and [facility] and [facility] order faxed

## 2020-11-26 NOTE — PROGRESS NOTES
Department of Orthopedic Surgery  Spine Service  Laurie Avani, [place] Progress Note        Subjective:    11/26/20  Clifton Morris is resting in bed doing well. Swallowing improving. Some posterior neck discomfort as expected. Not much improvement in right upper extremity numbness.      Vitals  VITALS:  BP (!) 147/89   Pulse 98   Temp 97.9 °F (36.6 °C) (Oral)   Resp 18   Ht 5' 9\" (1.753 m)   Wt 152 lb 6.4 oz (69.1 kg)   SpO2 95%   BMI 22.51 kg/m²   24HR INTAKE/OUTPUT:      Intake/Output Summary (Last 24 hours) at 11/26/2020 2927  Last data filed at 11/26/2020 0345  Gross per 24 hour   Intake 2975.86 ml   Output 70 ml   Net 2905.86 ml     URINARY CATHETER OUTPUT (Galeano):     DRAIN/TUBE OUTPUT:  Closed/Suction Drain Right Neck Bulb-Output (ml): 10 ml      PHYSICAL EXAM:    Orientation:  alert and oriented to person, place and time    Incision:  dressing in place, clean, dry, intact    Upper Extremity Motor :   Deltoid:  5/5  Biceps:  5/5  Triceps:  5/5  Wrist Flexion:  5/5  Wrist Extension:  5/5  Finger Flexion:  5/5  Finger Extension:  5/5    Upper Motor Neuron Signs:  None  Upper Extremity Sensory:  Intact C3-T1 distribution    Flatus:  negative    ABNORMAL EXAM FINDINGS:  none    LABS:    HgB:    Lab Results   Component Value Date    HGB 15.1 11/26/2020         ASSESSMENT AND PLAN:    Post operative day 1     1:  Monitor labs and drain output  2:  Activity Level:  OOB as tolerated  3:  Pain Control:  Controlled  4:  Discharge Planning:  Home today with [facility] for drains  5

## 2020-12-14 NOTE — DISCHARGE SUMMARY
[address] [place] [address]                               DISCHARGE SUMMARY    PATIENT NAME: Priscilla Odonnell                   :        1975  MED REC NO:   [id number]                           ROOM:       0025  ACCOUNT NO:   [de-identified]                           ADMIT DATE: 2020  PROVIDER:     Huy Llanos PA-C                    [address] [place] [address] DATE: 2020    DISCHARGE DIAGNOSES:  Cervical spondylosis and stenosis with osteophyte  and disk herniation with resultant cervical radiculopathy and  myelopathy. OPERATION PERFORMED:  Anterior cervical diskectomy and fusion of C5 to  C7 with allograft bone and plating. HOSPITAL COURSE:  The patient is a 50-year-old male who presented to our  office with acute onset of severe weakness and right upper extremity  radiculopathy with loss of comfort to the right upper extremity and loss  of  strength. He showed very significant and severe stenosis at the  level of C5-C6 and C6-C7 with significant neurologic changes associated. He elected to proceed with further operative management and underwent  surgery on the date of 2020 after which he was admitted to the  floor 7 for continued monitoring and care. On his first day  postoperatively, he did notice improvement of his right upper extremity  tingling and numbness. He still had some weakness which was expected. He was swallowing well. He was fully neurologically intact with similar  neurologic deficits of right upper extremity weakness compared to prior  to surgery. Otherwise, he was swallowing well and doing well with his  overall pain control and was ready to be discharged home on postop day  #1. He will be discharged home with the drains in place and dressing  intact in order for home health for continued management of these at  home. DISCHARGE MEDICATIONS:  Included Percocet and Flexeril. DISCHARGE INSTRUCTIONS:  Include continued use of cervical collar for  the next two weeks. No heavy lifting, bending, or twisting for two  weeks. No driving for two weeks. We will see him back in the office in  two weeks' time for reevaluation with cervical spine x-rays and to see  how he is coming along clinically in his recovery.         Tyree Clayton    D: 12/13/2020 8:44:25       T: 12/13/2020 8:46:52     [email or web address]  Job#: [id number]     Doc#: [id number]    CC:  Smooth Hidalgo CNP

## 2021-02-06 ENCOUNTER — APPOINTMENT (OUTPATIENT)
Dept: GENERAL RADIOLOGY | Age: 46
End: 2021-02-06
Payer: COMMERCIAL

## 2021-02-06 ENCOUNTER — HOSPITAL ENCOUNTER (EMERGENCY)
Age: 46
Discharge: HOME | End: 2021-02-06
Attending: EMERGENCY MEDICINE
Payer: COMMERCIAL

## 2021-02-06 VITALS
HEIGHT: 69 IN | SYSTOLIC BLOOD PRESSURE: 113 MMHG | OXYGEN SATURATION: 95 % | WEIGHT: 160 LBS | DIASTOLIC BLOOD PRESSURE: 71 MMHG | RESPIRATION RATE: 18 BRPM | TEMPERATURE: 98.5 F | HEART RATE: 96 BPM | BODY MASS INDEX: 23.7 KG/M2

## 2021-02-06 DIAGNOSIS — K64.8 INTERNAL HEMORRHOIDS: Primary | ICD-10-CM

## 2021-02-06 DIAGNOSIS — S39.012D STRAIN OF LUMBAR REGION, SUBSEQUENT ENCOUNTER: ICD-10-CM

## 2021-02-06 LAB
ALBUMIN SERPL-MCNC: 4.4 G/DL (ref 3.5–5.1)
ALP BLD-CCNC: 70 U/L (ref 38–126)
ALT SERPL-CCNC: 17 U/L (ref 11–66)
ANION GAP SERPL CALCULATED.3IONS-SCNC: 10 MEQ/L (ref 8–16)
AST SERPL-CCNC: 13 U/L (ref 5–40)
BASOPHILS # BLD: 0.6 %
BASOPHILS ABSOLUTE: 0 THOU/MM3 (ref 0–0.1)
BILIRUB SERPL-MCNC: 0.3 MG/DL (ref 0.3–1.2)
BUN BLDV-MCNC: 10 MG/DL (ref 7–22)
CALCIUM SERPL-MCNC: 9.8 MG/DL (ref 8.5–10.5)
CHLORIDE BLD-SCNC: 102 MEQ/L (ref 98–111)
CO2: 24 MEQ/L (ref 23–33)
CREAT SERPL-MCNC: 0.8 MG/DL (ref 0.4–1.2)
EOSINOPHIL # BLD: 4.2 %
EOSINOPHILS ABSOLUTE: 0.3 THOU/MM3 (ref 0–0.4)
ERYTHROCYTE [DISTWIDTH] IN BLOOD BY AUTOMATED COUNT: 13.2 % (ref 11.5–14.5)
ERYTHROCYTE [DISTWIDTH] IN BLOOD BY AUTOMATED COUNT: 45.7 FL (ref 35–45)
GFR SERPL CREATININE-BSD FRML MDRD: > 90 ML/MIN/1.73M2
GLUCOSE BLD-MCNC: 115 MG/DL (ref 70–108)
HCT VFR BLD CALC: 38.5 % (ref 42–52)
HEMOCCULT STL QL: NEGATIVE
HEMOGLOBIN: 13.4 GM/DL (ref 14–18)
IMMATURE GRANS (ABS): 0.02 THOU/MM3 (ref 0–0.07)
IMMATURE GRANULOCYTES: 0.3 %
LYMPHOCYTES # BLD: 27.8 %
LYMPHOCYTES ABSOLUTE: 1.8 THOU/MM3 (ref 1–4.8)
MCH RBC QN AUTO: 32.8 PG (ref 26–33)
MCHC RBC AUTO-ENTMCNC: 34.8 GM/DL (ref 32.2–35.5)
MCV RBC AUTO: 94.1 FL (ref 80–94)
MONOCYTES # BLD: 11.2 %
MONOCYTES ABSOLUTE: 0.7 THOU/MM3 (ref 0.4–1.3)
NUCLEATED RED BLOOD CELLS: 0 /100 WBC
OSMOLALITY CALCULATION: 271.9 MOSMOL/KG (ref 275–300)
PLATELET # BLD: 254 THOU/MM3 (ref 130–400)
PMV BLD AUTO: 9.9 FL (ref 9.4–12.4)
POTASSIUM SERPL-SCNC: 3.9 MEQ/L (ref 3.5–5.2)
RBC # BLD: 4.09 MILL/MM3 (ref 4.7–6.1)
SEG NEUTROPHILS: 55.9 %
SEGMENTED NEUTROPHILS ABSOLUTE COUNT: 3.7 THOU/MM3 (ref 1.8–7.7)
SODIUM BLD-SCNC: 136 MEQ/L (ref 135–145)
TOTAL PROTEIN: 7.5 G/DL (ref 6.1–8)
WBC # BLD: 6.6 THOU/MM3 (ref 4.8–10.8)

## 2021-02-06 PROCEDURE — 6370000000 HC RX 637 (ALT 250 FOR IP): Performed by: STUDENT IN AN ORGANIZED HEALTH CARE EDUCATION/TRAINING PROGRAM

## 2021-02-06 PROCEDURE — 82272 OCCULT BLD FECES 1-3 TESTS: CPT

## 2021-02-06 PROCEDURE — 85025 COMPLETE CBC W/AUTO DIFF WBC: CPT

## 2021-02-06 PROCEDURE — 80053 COMPREHEN METABOLIC PANEL: CPT

## 2021-02-06 PROCEDURE — 99284 EMERGENCY DEPT VISIT MOD MDM: CPT

## 2021-02-06 PROCEDURE — 36415 COLL VENOUS BLD VENIPUNCTURE: CPT

## 2021-02-06 PROCEDURE — 72100 X-RAY EXAM L-S SPINE 2/3 VWS: CPT

## 2021-02-06 RX ORDER — SENNA AND DOCUSATE SODIUM 50; 8.6 MG/1; MG/1
1 TABLET, FILM COATED ORAL DAILY
Qty: 10 TABLET | Refills: 0 | Status: SHIPPED | OUTPATIENT
Start: 2021-02-06 | End: 2021-02-16

## 2021-02-06 RX ORDER — POLYETHYLENE GLYCOL 3350 17 G/17G
17 POWDER, FOR SOLUTION ORAL DAILY PRN
Qty: 10 EACH | Refills: 0 | Status: SHIPPED | OUTPATIENT
Start: 2021-02-06 | End: 2021-02-16

## 2021-02-06 RX ORDER — ACETAMINOPHEN 325 MG/1
650 TABLET ORAL ONCE
Status: COMPLETED | OUTPATIENT
Start: 2021-02-06 | End: 2021-02-06

## 2021-02-06 RX ORDER — LIDOCAINE 4 G/G
1 PATCH TOPICAL DAILY
Status: DISCONTINUED | OUTPATIENT
Start: 2021-02-06 | End: 2021-02-06 | Stop reason: HOSPADM

## 2021-02-06 RX ORDER — PSYLLIUM SEED (WITH DEXTROSE)
1 POWDER (GRAM) ORAL 2 TIMES DAILY
Qty: 20 PACKET | Refills: 0 | Status: SHIPPED | OUTPATIENT
Start: 2021-02-06 | End: 2021-02-16

## 2021-02-06 RX ADMIN — ACETAMINOPHEN 650 MG: 325 TABLET ORAL at 10:41

## 2021-02-06 ASSESSMENT — ENCOUNTER SYMPTOMS
EYE PAIN: 0
BACK PAIN: 1
COUGH: 0
SINUS PAIN: 0
SHORTNESS OF BREATH: 0
NAUSEA: 0
VOMITING: 0
CONSTIPATION: 1
BLOOD IN STOOL: 1
DIARRHEA: 0

## 2021-02-06 ASSESSMENT — PAIN SCALES - GENERAL: PAINLEVEL_OUTOF10: 8

## 2021-02-06 ASSESSMENT — PAIN DESCRIPTION - FREQUENCY: FREQUENCY: CONTINUOUS

## 2021-02-06 ASSESSMENT — PAIN DESCRIPTION - LOCATION: LOCATION: BACK

## 2021-02-06 NOTE — ED PROVIDER NOTES
[facility] ENCOUNTER          Pt Name: Rubina Delarosa  MRN: [id number]  [place] 1975  Date of evaluation: 2/6/2021  Treating Resident Physician: Ry Patel MD  Supervising Physician: Dr. Ko Gonzalez MD    [address] [place] [address]       Chief Complaint   Patient presents with    Back Pain    Rectal Bleeding     History obtained from the patient. HISTORY OF PRESENT ILLNESS    HPI  Rubina Delarosa is a 54 y.o. male who presents to the emergency department for evaluation of bright red blood per rectum. Patient states earlier this morning he had a bowel movement where he noticed bright red blood when he wiped on the toilet paper. Patient states that he was unable to notice the signs in the toilet. Patient states he was diagnosed with MS recently and states he \" is worked up [employer] all this that is going on. Patient states was single episode and has not had this issue in the past. Patient denies any abdominal pain. Patient states he has had harder stools recently and has been having to strain when taking a bowel movement. Patient denies any diarrhea. Patient denies any nausea or vomiting. Patient denies any anticoagulation. Patient denies any headache fever chills. Patient denies any anticoagulation. Patient has a history of multiple sclerosis. Patient denies any new headache fever chills cough chest pain shortness of breath abdominal pain nausea vomiting diarrhea constipation leg swelling. The patient has no other acute complaints at this time. REVIEW OF SYSTEMS   Review of Systems   Constitutional: Positive for appetite change. Negative for chills and fever. HENT: Negative for ear pain and sinus pain. Eyes: Negative for pain. Respiratory: Negative for cough and shortness of breath. Cardiovascular: Negative for chest pain. Gastrointestinal: Positive for blood in stool and constipation.  Negative for diarrhea, nausea and vomiting. Genitourinary: Negative for dysuria. Musculoskeletal: Positive for back pain. Negative for neck pain. Skin: Negative for wound. Neurological: Positive for headaches. Psychiatric/Behavioral: Negative for confusion. PAST MEDICAL AND SURGICAL HISTORY     Past Medical History:   Diagnosis Date    Hypertension     Insomnia     Multiple sclerosis ([facility] [address] 11/2020    Myalgia      Past Surgical History:   Procedure Laterality Date    APPENDECTOMY      CERVICAL FUSION N/A 11/25/2020    ACDF C5-C7 performed by Navya Fried MD at [address] [place] [address]  Bilateral     KNEE SURGERY Left 1992    MCL, ACL Repair         MEDICATIONS   No current facility-administered medications for this encounter.      Current Outpatient Medications:     psyllium (METAMUCIL) 28 % packet, Take 1 packet by mouth 2 times daily for 10 days Take with full glass of h20 or juice, Disp: 20 packet, Rfl: 0    sennosides-docusate sodium (SENOKOT-S) 8.6-50 MG tablet, Take 1 tablet by mouth daily for 10 days, Disp: 10 tablet, Rfl: 0    polyethylene glycol (MIRALAX) 17 g packet, Take 17 g by mouth daily as needed for Constipation, Disp: 10 each, Rfl: 0    predniSONE (DELTASONE) 20 MG tablet, Take 20 mg by mouth 2 times daily, Disp: , Rfl:     lisinopril (PRINIVIL;ZESTRIL) 40 MG tablet, Take 40 mg by mouth daily, Disp: , Rfl:     Aspirin-Acetaminophen-Caffeine (EXCEDRIN MIGRAINE PO), Take 2 tablets by mouth daily as needed, Disp: , Rfl:       SOCIAL HISTORY     Social History     Social History Narrative    Not on file     Social History     Tobacco Use    Smoking status: Never Smoker    Smokeless tobacco: Current User     Types: Chew   Substance Use Topics    Alcohol use: No    Drug use: Not Currently     Types: Marijuana     Comment: daily         ALLERGIES     Allergies   Allergen Reactions    Morphine And Related Hives         FAMILY HISTORY     Family History   Problem Relation Age of Onset    Other Maternal Grandmother         Altheimers    Cancer Maternal Grandfather         Pancreatic    Mental Illness Maternal Grandfather     Other Paternal Grandmother         Aneurysm    Cancer Paternal Grandfather         Brain         PREVIOUS RECORDS   Previous records reviewed: Patient was seen last on January 4, 2021 for neurology office visit regarding his multiple sclerosis. PHYSICAL EXAM     ED Triage Vitals   BP Temp Temp src Pulse Resp SpO2 Height Weight   -- -- -- -- -- -- -- --     Initial vital signs and nursing assessment reviewed and vitals are/show: normal. Pulsoximetry is normal per my interpretation. Additional Vital Signs:  Vitals:    02/06/21 0841   BP: 113/71   Pulse: 96   Resp: 18   Temp: 98.5 °F (36.9 °C)   SpO2: 95%       Physical Exam  Constitutional:       General: He is not in acute distress. Appearance: Normal appearance. He is normal weight. He is not ill-appearing, toxic-appearing or diaphoretic. HENT:      Head: Normocephalic and atraumatic. Right Ear: External ear normal.      Left Ear: External ear normal.   Eyes:      General: No scleral icterus. Right eye: No discharge. Left eye: No discharge. Neck:      Musculoskeletal: Normal range of motion and neck supple. Cardiovascular:      Rate and Rhythm: Normal rate and regular rhythm. Pulses: Normal pulses. Heart sounds: No murmur. No friction rub. No gallop. Comments: Bilateral upper extremity pulses equal  Pulmonary:      Effort: Pulmonary effort is normal. No respiratory distress. Breath sounds: Normal breath sounds. No wheezing or rales. Chest:      Chest wall: No tenderness. Abdominal:      General: Abdomen is flat. There is no distension. Palpations: Abdomen is soft. Tenderness: There is no abdominal tenderness. There is no guarding or rebound. Genitourinary:     Rectum: Guaiac result negative.       Comments: No gross blood on PAWAN  No visible external hemorrhoids  No fissures  No palpable internal hemorrhoids or masses  Dried blood surrounding rectum  Musculoskeletal: Normal range of motion. Right lower leg: No edema. Left lower leg: No edema. Skin:     General: Skin is warm and dry. Findings: No rash. Neurological:      Mental Status: He is alert and oriented to person, place, and time. Mental status is at baseline. Psychiatric:         Mood and Affect: Mood normal.         Behavior: Behavior normal.         Thought Content: Thought content normal.         Judgment: Judgment normal.             MEDICAL DECISION MAKING   Initial Assessment:   External versus internal hemorrhoids versus anal fissure versus mass versus constipation      Plan:     Labs. Analgesia      Patient came to ED for evaluation of bright red blood per rectum on toilet paper. Patient's lab work was unremarkable and patient's occult stool was negative. Given patient's single episode of blood on toilet paper, internal hemorrhoid is but serious pathology is less likely. After discussing plan of discharge with bowel regimen regarding constipation in addition to GI follow-up, patient suddenly changed his concern and stated that his lower back pain was the main issue and that he fell recently. I then offered imaging and place order for analgesia. Patient was then reevaluated and he stated he wanted to leave and had \"wasted enough time here already. \"And refused imaging and analgesia. Patient had changed into his original close and had self removed his IV. Patient then walked out of the room when asked where the exit was and began walking in that direction. It was believed that patient had eloped. Later, nursing staff mentioned that patient was back in the room and was awaiting results of imaging. Imaging was unremarkable. Patient states his back pain was feeling better. Discussed again with patient regarding bowel regimen and outpatient follow-up regarding his lower back pain that is chronic. Patient agreed and was discharged. ED RESULTS   Laboratory results:  Labs Reviewed   CBC WITH AUTO DIFFERENTIAL - Abnormal; Notable for the following components:       Result Value    RBC 4.09 (*)     Hemoglobin 13.4 (*)     Hematocrit 38.5 (*)     MCV 94.1 (*)     RDW-SD 45.7 (*)     All other components within normal limits   COMPREHENSIVE METABOLIC PANEL - Abnormal; Notable for the following components:    Glucose 115 (*)     All other components within normal limits   OSMOLALITY - Abnormal; Notable for the following components:    Osmolality Calc 271.9 (*)     All other components within normal limits   BLOOD OCCULT STOOL SCREEN #1   ANION GAP   GLOMERULAR FILTRATION RATE, ESTIMATED       Radiologic studies results:  XR LUMBAR SPINE (2-3 VIEWS)   Final Result      No fracture or spondylolisthesis of the lumbar spine. Final report electronically signed by Dr. Juan Carlos Landa on 2/6/2021 10:36 AM          ED Medications administered this visit:   Medications   acetaminophen (TYLENOL) tablet 650 mg (650 mg Oral Given 2/6/21 1041)         ED COURSE     ED Course as of Feb 06 1709   Sat Feb 06, 2021   1016 Reassessed patient, explained would order xray for LBP and tylenol or pain, patient stated he had \"wasted enough time here\" and was going to leave and \"did not want\" any scripts or further evaluation. Patient refused further evaluation and was able to ambulate to door to leave    [CR]      ED Course User Index  [CR] Tena Paz MD        Strict return precautions and follow up instructions were discussed with the patient prior to discharge, with which the patient agrees.       MEDICATION CHANGES     Discharge Medication List as of 2/6/2021 11:12 AM      START taking these medications    Details   psyllium (METAMUCIL) 28 % packet Take 1 packet by mouth 2 times daily for 10 days Take with full glass of h20 or juice, Disp-20 packet, R-0Normal      sennosides-docusate sodium (SENOKOT-S) 8.6-50 MG tablet Take 1 tablet by mouth daily for 10 days, Disp-10 tablet, R-0Normal      polyethylene glycol (MIRALAX) 17 g packet Take 17 g by mouth daily as needed for Constipation, Disp-10 each, R-0Normal               FINAL DISPOSITION     Final diagnoses:   Internal hemorrhoids   Strain of lumbar region, subsequent encounter     Condition: condition: stable  Dispo: Discharge to home      This transcription was electronically signed. Parts of this transcriptions may have been dictated by use of voice recognition software and electronically transcribed, and parts may have been transcribed with the assistance of an ED scribe. The transcription may contain errors not detected in proofreading. Please refer to my supervising physician's documentation if my documentation differs.     Electronically Signed: Adeola Caban, 02/06/21, 5:09 PM       Adeola Caban MD  Resident  02/06/21 6378

## 2021-02-06 NOTE — ED PROVIDER NOTES
ATTENDING NOTE:    I supervised and discussed the history, physical exam and the management of this patient with the resident. I reviewed the resident's note and agree with the documented findings and plan of care. Please see my additional note. Patient presented for reported episode of bright red blood per rectum seen when wiping with toilet paper this am, no rectal pain, has had some decreased frequency of bowel movements recently. Patient also reported in his ROS a history of ongoing low back pain for months. Patient had exam including rectal exam and labs obtained which were reassuring, fecal occult was negative for blood. Resident re-assessed patient to update him on labs, patient reportedly then suddenly stated that he wanted to be evaluated for his low back pain, and then reportedly changed his history, stating that he had a slip and fall on the ice recently. Due to this, xrays and pain medication were ordered. Resident went back in again to check on patient and found that patient had removed his IV, and was stating that he was leaving. Patient had been in the ED approximately 2 hours at that point in time, and would not stay for xrays or further treatment.     Electronically verified by Galen Hall MD  02/06/21 0638

## 2021-02-06 NOTE — ED NOTES
Patient presents to ED for chronic back pain and rectal bleeding. States he has had the back pain for years but worsened last night with NKI. Patient reports this morning he noticed blood on the toilet paper after going to the bathroom. Rates pain 8/10. Shows no signs of distress. Skin warm and dry. Respirations easy and unlabored.       Dimitry Hutchison RN  02/06/21 3956

## (undated) DEVICE — TAPE,CLOTH/SILK,CURAD,3"X10YD,LF,40/CS: Brand: CURAD

## (undated) DEVICE — GLOVE ORANGE PI 8   MSG9080

## (undated) DEVICE — GAUZE,SPONGE,4"X4",12PLY,STERILE,LF,2'S: Brand: MEDLINE

## (undated) DEVICE — COLLAR CERV UNIV AD L13-19IN TRACH OPN TWO PC RIG POLYETH

## (undated) DEVICE — BLADE ES L4IN INSUL EDGE

## (undated) DEVICE — APPLICATOR MEDICATED 10.5 CC SOLUTION HI LT ORNG CHLORAPREP

## (undated) DEVICE — DRESSING TRNSPAR W5XL4.5IN FLM SHT SEMIPERMEABLE WIND

## (undated) DEVICE — SUTURE VCRL SZ 3-0 L18IN ABSRB VLT SUTUPAK PRECUT W/O NDL J104T

## (undated) DEVICE — GLOVE ORANGE PI 7   MSG9070

## (undated) DEVICE — LINER SUCT CANSTR 1500CC SEMI RIG W/ POR HYDROPHOBIC SHUT

## (undated) DEVICE — 4.0MM ROUND FLUTED AGGRESSIVE

## (undated) DEVICE — BLADE CLIPPER GEN PURP NS

## (undated) DEVICE — BASIC SINGLE BASIN BTC-LF: Brand: MEDLINE INDUSTRIES, INC.

## (undated) DEVICE — GOWN,AURORA,NON-REINFORCED,2XL: Brand: MEDLINE

## (undated) DEVICE — SUTURE MCRYL SZ 4-0 L27IN ABSRB UD L19MM PS-2 1/2 CIR PRIM Y426H

## (undated) DEVICE — Device

## (undated) DEVICE — SUTURE VCRL + SZ 2-0 L27IN ABSRB UD CP-1 1/2 CIR REV CUT VCP266H

## (undated) DEVICE — DRAPE MICSCP W132XL406CM LENS DIA68MM W VARI LENS2 FOR LEICA

## (undated) DEVICE — ZINACTIVE USE 2539609 APPLICATOR MEDICATED 10.5 CC SOLUTION HI LT ORNG CHLORAPREP

## (undated) DEVICE — SUTURE VCRL SZ 1 L27IN ABSRB UD CT-1 L36MM 1/2 CIR J261H

## (undated) DEVICE — Device: Brand: FABCO

## (undated) DEVICE — 1010 S-DRAPE TOWEL DRAPE 10/BX: Brand: STERI-DRAPE™

## (undated) DEVICE — STRIP,CLOSURE,WOUND,MEDI-STRIP,1/2X4: Brand: MEDLINE

## (undated) DEVICE — DRAIN SURG FLAT W7MMXL20CM FULL PERF

## (undated) DEVICE — GLOVE ORANGE PI 8 1/2   MSG9085

## (undated) DEVICE — AGENT HEMOSTATIC SURGIFLOW MATRIX KIT W/THROMBIN

## (undated) DEVICE — SUTURE VCRL SZ 3-0 L27IN ABSRB UD FS-2 L19MM 1/2 CIR J423H

## (undated) DEVICE — 3M™ STERI-STRIP™ COMPOUND BENZOIN TINCTURE 40 BAGS/CARTON 4 CARTONS/CASE C1544: Brand: 3M™ STERI-STRIP™

## (undated) DEVICE — SUTURE ETHLN SZ 3-0 L18IN NONABSORBABLE BLK FS-1 L24MM 3/8 663H

## (undated) DEVICE — GLOVE SURG SZ 65 THK91MIL LTX FREE SYN POLYISOPRENE

## (undated) DEVICE — YANKAUER,BULB TIP,W/O VENT,RIGID,STERILE: Brand: MEDLINE

## (undated) DEVICE — GOWN,SIRUS,NON REINFRCD,LARGE,SET IN SL: Brand: MEDLINE

## (undated) DEVICE — EVACUATOR SURG 100CC SIL BLB SUCT RESVR FOR CLS WND DRNGE

## (undated) DEVICE — GLOVE SURG SZ 9 THK91MIL LTX FREE SYN POLYISOPRENE ANTI

## (undated) DEVICE — TUBING, SUCTION, 1/4" X 20', STRAIGHT: Brand: MEDLINE INDUSTRIES, INC.

## (undated) DEVICE — SUTURE VCRL SZ 3-0 L27IN ABSRB UD L26MM SH 1/2 CIR J416H